# Patient Record
Sex: MALE
[De-identification: names, ages, dates, MRNs, and addresses within clinical notes are randomized per-mention and may not be internally consistent; named-entity substitution may affect disease eponyms.]

---

## 2018-02-24 ENCOUNTER — HOSPITAL ENCOUNTER (EMERGENCY)
Dept: HOSPITAL 14 - H.ER | Age: 48
Discharge: HOME | End: 2018-02-24
Payer: COMMERCIAL

## 2018-02-24 VITALS — SYSTOLIC BLOOD PRESSURE: 131 MMHG | HEART RATE: 85 BPM | DIASTOLIC BLOOD PRESSURE: 74 MMHG

## 2018-02-24 VITALS — TEMPERATURE: 99 F

## 2018-02-24 VITALS — RESPIRATION RATE: 18 BRPM

## 2018-02-24 DIAGNOSIS — N45.3: Primary | ICD-10-CM

## 2018-02-24 DIAGNOSIS — I86.1: ICD-10-CM

## 2018-02-24 LAB
ALBUMIN SERPL-MCNC: 4.2 G/DL (ref 3.5–5)
ALBUMIN/GLOB SERPL: 1.2 {RATIO} (ref 1–2.1)
ALT SERPL-CCNC: 61 U/L (ref 21–72)
APTT BLD: 31.2 SECONDS (ref 25.6–37.1)
AST SERPL-CCNC: 42 U/L (ref 17–59)
BASE EXCESS BLDV CALC-SCNC: 4 MMOL/L (ref 0–2)
BASOPHILS # BLD AUTO: 0.1 K/UL (ref 0–0.2)
BASOPHILS NFR BLD: 0.5 % (ref 0–2)
BILIRUB UR-MCNC: NEGATIVE MG/DL
BUN SERPL-MCNC: 15 MG/DL (ref 9–20)
CALCIUM SERPL-MCNC: 8.9 MG/DL (ref 8.4–10.2)
COLOR UR: YELLOW
EOSINOPHIL # BLD AUTO: 0 K/UL (ref 0–0.7)
EOSINOPHIL NFR BLD: 0 % (ref 0–4)
ERYTHROCYTE [DISTWIDTH] IN BLOOD BY AUTOMATED COUNT: 12.4 % (ref 11.5–14.5)
GFR NON-AFRICAN AMERICAN: > 60
GLUCOSE UR STRIP-MCNC: (no result) MG/DL
HGB BLD-MCNC: 14.6 G/DL (ref 12–18)
INR PPP: 1.2 (ref 0.9–1.2)
LEUKOCYTE ESTERASE UR-ACNC: (no result) LEU/UL
LYMPHOCYTES # BLD AUTO: 1.4 K/UL (ref 1–4.3)
LYMPHOCYTES NFR BLD AUTO: 12.6 % (ref 20–40)
MCH RBC QN AUTO: 29.9 PG (ref 27–31)
MCHC RBC AUTO-ENTMCNC: 34 G/DL (ref 33–37)
MCV RBC AUTO: 88.1 FL (ref 80–94)
MONOCYTES # BLD: 1 K/UL (ref 0–0.8)
MONOCYTES NFR BLD: 9 % (ref 0–10)
NEUTROPHILS # BLD: 8.9 K/UL (ref 1.8–7)
NEUTROPHILS NFR BLD AUTO: 77.9 % (ref 50–75)
NRBC BLD AUTO-RTO: 0 % (ref 0–0)
PCO2 BLDV: 37 MMHG (ref 40–60)
PH BLDV: 7.48 [PH] (ref 7.32–7.43)
PH UR STRIP: 6 [PH] (ref 5–8)
PLATELET # BLD: 204 K/UL (ref 130–400)
PMV BLD AUTO: 8.5 FL (ref 7.2–11.7)
PROT UR STRIP-MCNC: NEGATIVE MG/DL
PROTHROMBIN TIME: 13.6 SECONDS (ref 9.8–13.1)
RBC # BLD AUTO: 4.89 MIL/UL (ref 4.4–5.9)
RBC # UR STRIP: NEGATIVE /UL
SP GR UR STRIP: 1.02 (ref 1–1.03)
URINE CLARITY: (no result)
URINE NITRATE: NEGATIVE
UROBILINOGEN UR-MCNC: (no result) MG/DL (ref 0.2–1)
VENOUS BLOOD FIO2: 21 %
VENOUS BLOOD GAS PO2: 50 MM/HG (ref 30–55)
WBC # BLD AUTO: 11.4 K/UL (ref 4.8–10.8)

## 2018-02-24 PROCEDURE — 96361 HYDRATE IV INFUSION ADD-ON: CPT

## 2018-02-24 PROCEDURE — 82803 BLOOD GASES ANY COMBINATION: CPT

## 2018-02-24 PROCEDURE — 85025 COMPLETE CBC W/AUTO DIFF WBC: CPT

## 2018-02-24 PROCEDURE — 87591 N.GONORRHOEAE DNA AMP PROB: CPT

## 2018-02-24 PROCEDURE — 85610 PROTHROMBIN TIME: CPT

## 2018-02-24 PROCEDURE — 87040 BLOOD CULTURE FOR BACTERIA: CPT

## 2018-02-24 PROCEDURE — 87086 URINE CULTURE/COLONY COUNT: CPT

## 2018-02-24 PROCEDURE — 81003 URINALYSIS AUTO W/O SCOPE: CPT

## 2018-02-24 PROCEDURE — 99284 EMERGENCY DEPT VISIT MOD MDM: CPT

## 2018-02-24 PROCEDURE — 87491 CHLMYD TRACH DNA AMP PROBE: CPT

## 2018-02-24 PROCEDURE — 93975 VASCULAR STUDY: CPT

## 2018-02-24 PROCEDURE — 80053 COMPREHEN METABOLIC PANEL: CPT

## 2018-02-24 PROCEDURE — 96365 THER/PROPH/DIAG IV INF INIT: CPT

## 2018-02-24 PROCEDURE — 85730 THROMBOPLASTIN TIME PARTIAL: CPT

## 2018-02-24 NOTE — US
HISTORY:

L testicular pain, fever



TECHNIQUE:

Realtime sonography through the scrotum with color and doppler flow.



COMPARISON:

None Available.



FINDINGS:



RIGHT TESTICLE:

Measures 5.2 x 3.1 x 2.4 cm. Normal echotexture and flow. No cystic 

or solid mass identified.



RIGHT EPIDIDYMIS:

Epididymal head measures 0.9 x 0.8 x 1.1 cm. Grossly unremarkable 

appearance with normal flow.



LEFT TESTICLE:

Measures 5.2 x 3.4 x 3.1 cm. Normal echotexture and flow. No cystic 

or solid mass identified. Testicular parenchyma is hyperemic on color 

Doppler imaging.



LEFT EPIDIDYMIS:

Epididymal head measures 1.0 x 1.3 x 1.5 cm. The epididymal body 

appears somewhat prominent and appears hyperemic on color ultrasound. 

No focal cystic or solid mass is identified.



HYDROCELE:

A mild left hydrocele is identified.



VARICOCELE:

A varicocele is identified the lateral left hemiscrotum.



OTHER FINDINGS:

None. 



IMPRESSION:

Findings compatible with left epididymo-orchitis.  No ultrasound 

evidence to suggest testicular torsion at this time.



Mild left hydrocele.



Left varicocele identified.



Unremarkable right hemiscrotum.

## 2018-02-24 NOTE — ED PDOC
HPI: Male  Pain


Time Seen by Provider: 18 07:33


Chief Complaint (Nursing): Fever


Chief Complaint (Provider): Fever


History Per: Patient


History/Exam Limitations: no limitations


Onset/Duration Of Symptoms: Days (2)


Additional Complaint(s): 





Pt reports L testicular pain X 4 days, was evaluated @ Urgent Care and dx with 

epididymitis, started on Levaquin, fever (Tm 101) started yesterday, last Advil 

dose 1 hour PTA.  Denies dysuria, hematuria, vaginal discharge, nausea, vomiting

, abdominal pain. 





Past Medical History


Reviewed: Nursing Documentation, Vital Signs


Vital Signs: 





 Last Vital Signs











Temp  100.9 F H  18 07:40


 


Pulse  109 H  18 07:41


 


Resp  18   18 07:34


 


BP  145/91 H  18 07:34


 


Pulse Ox  98   18 07:34














- Medical History


PMH: No Chronic Diseases





- Surgical History


Surgical History: No Surg Hx





- Family History


Family History: States: Unknown Family Hx





- Social History


Current smoker - smoking cessation education provided: No


Alcohol: None





- Home Medications


Home Medications: 


 Ambulatory Orders











 Medication  Instructions  Recorded


 


Doxycycline Hyclate 100 mg PO BID #19 capsule 18


 


Ibuprofen [Motrin] 600 mg PO Q6H PRN #20 tab 18














- Allergies


Allergies/Adverse Reactions: 


 Allergies











Allergy/AdvReac Type Severity Reaction Status Date / Time


 


No Known Allergies Allergy   Verified 18 07:34














Review of Systems


Constitutional: Positive for: Fever.  Negative for: Weakness


ENT: Negative for: Ear Pain, Throat Pain


Cardiovascular: Negative for: Chest Pain, Palpitations


Respiratory: Negative for: Cough, Shortness of Breath


Gastrointestinal: Negative for: Nausea, Vomiting, Abdominal Pain, Diarrhea


Genitourinary Male: Positive for: Scrotal Pain.  Negative for: Dysuria, 

Frequency, Incontinence, Hematuria, Penile Discharge, Rash, Penile Pain


Musculoskeletal: Negative for: Neck Pain, Back Pain


Skin: Negative for: Rash, Lesions


Neurological: Negative for: Weakness, Numbness, Headache, Dizziness





Physical Exam





- Reviewed


Nursing Documentation Reviewed: Yes


Vital Signs Reviewed: Yes





- Physical Exam


Appears: Positive for: Well, No Acute Distress


Head Exam: Positive for: ATRAUMATIC, NORMAL INSPECTION


Skin: Positive for: Normal Color, Warm, Dry


Eye Exam: Positive for: Normal appearance, EOMI, PERRL


Cardiovascular/Chest: Positive for: Tachycardia.  Negative for: Irregularly 

Irregular


Respiratory: Positive for: Normal Breath Sounds.  Negative for: Rales, Rhonchi, 

Wheezing


Gastrointestinal/Abdominal: Positive for: Normal Exam, Bowel Sounds, Soft.  

Negative for: Tenderness


Male Genital Exam: Positive for: epididymal tenderness (L), erythema, scrotum 

tenderness (L), testicular tenderness (R).  Negative for: bleeding, lesions, 

scrotum tenderness (R), testicular tenderness (L)


Back: Positive for: Normal Inspection.  Negative for: L CVA Tenderness, R CVA 

Tenderness


Extremity: Positive for: Normal ROM





- Laboratory Results


Result Diagrams: 


 18 08:25





 18 08:25





- ECG


O2 Sat by Pulse Oximetry: 98





Medical Decision Making


Medical Decision Makin yo with fever and L testicular pain.


- labs


- testicular ultrasound


- IVF


- Tylenol





Accession No. : [id number]


Patient Name / ID : BETSY POP  / [id number]


Exam Date : 2018 08:12:07 ( Approved )


Study Comment : 


Sex / Age : M  / 48Y





Creator : Yeison Moy MD


Dictator : Yeison Moy MD


 : 


Approver : Yeison Moy MD


Approver2 : 





Report Date : 2018 09:02:22


My Comment : 


********************************************************************************

***





HISTORY:


L testicular pain, fever





TECHNIQUE:


Realtime sonography through the scrotum with color and doppler flow.





COMPARISON:


None Available.





FINDINGS:





RIGHT TESTICLE:


Measures 5.2 x 3.1 x 2.4 cm. Normal echotexture and flow. No cystic or solid 

mass identified.





RIGHT EPIDIDYMIS:


Epididymal head measures 0.9 x 0.8 x 1.1 cm. Grossly unremarkable appearance 

with normal flow.





LEFT TESTICLE:


Measures 5.2 x 3.4 x 3.1 cm. Normal echotexture and flow. No cystic or solid 

mass identified. Testicular parenchyma is hyperemic on color Doppler imaging.





LEFT EPIDIDYMIS:


Epididymal head measures 1.0 x 1.3 x 1.5 cm. The epididymal body appears 

somewhat prominent and appears hyperemic on color ultrasound. No focal cystic 

or solid mass is identified.





HYDROCELE:


A mild left hydrocele is identified.





VARICOCELE:


A varicocele is identified the lateral left hemiscrotum.





OTHER FINDINGS:


None. 





IMPRESSION:


Findings compatible with left epididymo-orchitis.  No ultrasound evidence to 

suggest testicular torsion at this time.





Mild left hydrocele.





Left varicocele identified.





Unremarkable right hemiscrotum.





REPEAT HR: 85





Disposition





- Clinical Impression


Clinical Impression: 


 Epididymo-orchitis








- Disposition


Referrals: 


Jaison La MD [Medical Doctor] - 


Disposition: Routine/Home


Disposition Time: 11:33


Condition: IMPROVED


Prescriptions: 


Doxycycline Hyclate 100 mg PO BID #19 capsule


Ibuprofen [Motrin] 600 mg PO Q6H PRN #20 tab


 PRN Reason: Pain, Moderate (4-7)


Instructions:  Epididymo-orchitis (ED)


Forms:  [facility] (English)

## 2018-02-25 VITALS — OXYGEN SATURATION: 98 %
